# Patient Record
Sex: MALE | Race: WHITE | NOT HISPANIC OR LATINO | Employment: OTHER | URBAN - METROPOLITAN AREA
[De-identification: names, ages, dates, MRNs, and addresses within clinical notes are randomized per-mention and may not be internally consistent; named-entity substitution may affect disease eponyms.]

---

## 2017-10-03 ENCOUNTER — OUTSIDE FACILITY SERVICE (OUTPATIENT)
Dept: CARDIOLOGY | Facility: CLINIC | Age: 67
End: 2017-10-03

## 2017-10-03 PROCEDURE — 93018 CV STRESS TEST I&R ONLY: CPT | Performed by: INTERNAL MEDICINE

## 2017-10-03 PROCEDURE — 78452 HT MUSCLE IMAGE SPECT MULT: CPT | Performed by: INTERNAL MEDICINE

## 2017-10-30 PROBLEM — E66.9 CLASS 2 OBESITY IN ADULT: Status: ACTIVE | Noted: 2017-10-30

## 2017-10-30 PROBLEM — F41.9 ANXIETY: Status: ACTIVE | Noted: 2017-10-30

## 2017-10-30 PROBLEM — E78.2 MIXED HYPERLIPIDEMIA: Status: ACTIVE | Noted: 2017-10-30

## 2017-10-30 PROBLEM — Z86.73 HISTORY OF CVA (CEREBROVASCULAR ACCIDENT): Status: ACTIVE | Noted: 2017-10-30

## 2017-10-30 PROBLEM — I25.10 CAD (CORONARY ARTERY DISEASE): Status: ACTIVE | Noted: 2017-10-30

## 2017-10-30 PROBLEM — K21.9 GASTROESOPHAGEAL REFLUX DISEASE WITHOUT ESOPHAGITIS: Status: ACTIVE | Noted: 2017-10-30

## 2017-10-30 PROBLEM — I10 ESSENTIAL HYPERTENSION: Status: ACTIVE | Noted: 2017-10-30

## 2017-10-30 PROBLEM — I20.9 ANGINA PECTORIS (HCC): Status: ACTIVE | Noted: 2017-10-30

## 2017-10-30 PROBLEM — E11.9 TYPE 2 DIABETES MELLITUS (HCC): Status: ACTIVE | Noted: 2017-10-30

## 2017-10-30 PROBLEM — G47.33 OSA (OBSTRUCTIVE SLEEP APNEA): Status: ACTIVE | Noted: 2017-10-30

## 2017-10-31 ENCOUNTER — OFFICE VISIT (OUTPATIENT)
Dept: CARDIOLOGY | Facility: CLINIC | Age: 67
End: 2017-10-31

## 2017-10-31 VITALS
DIASTOLIC BLOOD PRESSURE: 59 MMHG | BODY MASS INDEX: 36.96 KG/M2 | WEIGHT: 230 LBS | HEART RATE: 60 BPM | SYSTOLIC BLOOD PRESSURE: 113 MMHG | HEIGHT: 66 IN

## 2017-10-31 DIAGNOSIS — I20.9 ANGINA PECTORIS (HCC): Primary | ICD-10-CM

## 2017-10-31 DIAGNOSIS — I25.119 CORONARY ARTERY DISEASE INVOLVING NATIVE CORONARY ARTERY OF NATIVE HEART WITH ANGINA PECTORIS (HCC): ICD-10-CM

## 2017-10-31 DIAGNOSIS — E78.2 MIXED HYPERLIPIDEMIA: ICD-10-CM

## 2017-10-31 DIAGNOSIS — I10 ESSENTIAL HYPERTENSION: ICD-10-CM

## 2017-10-31 PROBLEM — I20.1 PRINZMETAL ANGINA (HCC): Status: ACTIVE | Noted: 2017-10-30

## 2017-10-31 PROBLEM — J44.9 COPD (CHRONIC OBSTRUCTIVE PULMONARY DISEASE) (HCC): Status: ACTIVE | Noted: 2017-10-31

## 2017-10-31 PROCEDURE — 99204 OFFICE O/P NEW MOD 45 MIN: CPT | Performed by: INTERNAL MEDICINE

## 2017-10-31 PROCEDURE — 93000 ELECTROCARDIOGRAM COMPLETE: CPT | Performed by: INTERNAL MEDICINE

## 2017-10-31 RX ORDER — ROSUVASTATIN CALCIUM 20 MG/1
20 TABLET, COATED ORAL DAILY
COMMUNITY
End: 2020-11-03

## 2017-10-31 RX ORDER — ALBUTEROL SULFATE 90 UG/1
90 AEROSOL, METERED RESPIRATORY (INHALATION) AS NEEDED
COMMUNITY
End: 2020-11-03

## 2017-10-31 RX ORDER — INSULIN GLARGINE 100 [IU]/ML
INJECTION, SOLUTION SUBCUTANEOUS DAILY
COMMUNITY
End: 2018-04-17

## 2017-10-31 RX ORDER — CARVEDILOL 12.5 MG/1
12.5 TABLET ORAL 2 TIMES DAILY WITH MEALS
COMMUNITY

## 2017-10-31 RX ORDER — ESCITALOPRAM OXALATE 20 MG/1
20 TABLET ORAL DAILY
COMMUNITY

## 2017-10-31 RX ORDER — RANOLAZINE 500 MG/1
500 TABLET, EXTENDED RELEASE ORAL 2 TIMES DAILY
Qty: 60 TABLET | Refills: 6 | COMMUNITY
Start: 2017-10-31 | End: 2018-04-17

## 2017-10-31 RX ORDER — CLOPIDOGREL BISULFATE 75 MG/1
75 TABLET ORAL DAILY
COMMUNITY
End: 2020-11-03

## 2017-10-31 RX ORDER — BUMETANIDE 1 MG/1
1 TABLET ORAL DAILY
COMMUNITY
End: 2021-06-22

## 2017-10-31 RX ORDER — BACLOFEN 10 MG/1
10 TABLET ORAL 2 TIMES DAILY
COMMUNITY
End: 2020-11-03 | Stop reason: SDUPTHER

## 2017-10-31 RX ORDER — LISINOPRIL 40 MG/1
40 TABLET ORAL DAILY
COMMUNITY
End: 2017-10-31

## 2017-10-31 RX ORDER — ALPRAZOLAM 1 MG/1
1 TABLET ORAL DAILY
COMMUNITY

## 2017-10-31 RX ORDER — BUDESONIDE AND FORMOTEROL FUMARATE DIHYDRATE 160; 4.5 UG/1; UG/1
2 AEROSOL RESPIRATORY (INHALATION)
COMMUNITY
End: 2020-11-03

## 2017-10-31 RX ORDER — BUTALBITAL, ACETAMINOPHEN AND CAFFEINE 50; 325; 40 MG/1; MG/1; MG/1
1 TABLET ORAL EVERY 4 HOURS PRN
COMMUNITY
End: 2020-11-03

## 2017-10-31 RX ORDER — ESOMEPRAZOLE MAGNESIUM 40 MG/1
40 CAPSULE, DELAYED RELEASE ORAL
COMMUNITY
End: 2020-11-03

## 2017-10-31 RX ORDER — LISINOPRIL 20 MG/1
20 TABLET ORAL DAILY
Qty: 30 TABLET | Refills: 6 | Status: SHIPPED | OUTPATIENT
Start: 2017-10-31 | End: 2021-06-22

## 2017-10-31 RX ORDER — NITROGLYCERIN 0.3 MG/1
TABLET SUBLINGUAL
Qty: 100 TABLET | Refills: 11 | Status: SHIPPED | OUTPATIENT
Start: 2017-10-31 | End: 2017-11-02 | Stop reason: SDUPTHER

## 2017-10-31 NOTE — PROGRESS NOTES
Encounter Date:10/31/2017      Patient ID: Ryan Odonnell is a 67 y.o. male.    Chief Complaint: Chest Pain      PROBLEM LIST:  1. ***    History of Present Illness  Patient presents today for follow-up with a history of *** and cardiac risk factors. ***. Denies chest pain, shortness of breath, leg swelling, palpitations, and syncope. Remains busy and active with ***.    Allergies not on file    No current outpatient prescriptions on file.    The following portions of the patient's history were reviewed and updated as appropriate: allergies, current medications, past family history, past medical history, past social history, past surgical history and problem list.    ROS  Review of Systems   Constitution: Negative for chills, fever, weight gain and weight loss.   Cardiovascular: Negative for chest pain, claudication, dyspnea on exertion, leg swelling, orthopnea, palpitations, paroxysmal nocturnal dyspnea and syncope.        No dizziness   Gastrointestinal: Negative for abdominal pain, constipation, diarrhea, nausea and vomiting.   Genitourinary:        No urinary symptoms   Neurological:        No symptoms of stroke.   All other systems reviewed and are negative.    Objective:     There were no vitals taken for this visit.  Repeat blood pressure measurement by, Fish Burton MD, at ***      Physical Exam  Constitutional: She appears well-developed and well-nourished.   HENT:   HEENT exam unremarkable.   Neck: Neck supple. No JVD present.   No carotid bruits.   Cardiovascular: Normal rate, regular rhythm and normal heart sounds.    No murmur heard.  2 plus symmetric pulses.   Pulmonary/Chest: Breath sounds normal. Does not exhibit tenderness.   Abdominal:   Abdomen benign.   Musculoskeletal: Does not exhibit edema.   Neurological:   Neurological exam unremarkable.   Vitals reviewed.    Lab Review:   Procedures       Assessment:   No diagnosis found.  Plan:   ***  Stable cardiac status.  Continue current  medications.   FU in 6 MO, sooner as needed.  Thank you for allowing us to participate in the care of your patient.     Scribed for Fish Burton MD by Seymour Torres. 10/31/2017  9:28 AM    ***    Please note that portions of this note may have been completed with a voice recognition program. Efforts were made to edit the dictations, but occasionally words are mistranscribed.

## 2017-10-31 NOTE — PROGRESS NOTES
Subjective   Facundo Odonnell is a 67 y.o. male.  MD Katie Stafford MD  [address] [facility] [place] [address]      Chief Complaint   Patient presents with   • Chest Pain       Patient Active Problem List    Diagnosis   • CAD (coronary artery disease) [I25.10]               1. [facility] 12-7-2009: No hemodynamically significant disease. Minor luminal irregularities, 30% LAD, 10% Circ.  EF 60%    2. Echo 9-2-17:Normal LV wall thickness.  EF 45-50%. Mild global HK.    3. MPS 10-3-17: No anginal symptoms. No EKG changes. Fixed apical defect. No reversible ischemia.  EF 45%       • Angina pectoris [I20.9]        • Class 2 obesity in adult [E66.9]        • Essential hypertension [I10]        • Mixed hyperlipidemia [E78.2]        • Type 2 diabetes mellitus [E11.9]        • ANDREW (obstructive sleep apnea) [G47.33]        • Gastroesophageal reflux disease without esophagitis [K21.9]        • Anxiety [F41.9]        • History of CVA (cerebrovascular accident) [Z86.73]               1. Ischemic, Left paramedian pontine lacunar stroke with right hemiparesis     • COPD (chronic obstructive pulmonary disease) [J44.9]        History of Present Illness   This is a 67 year old hypertensive dyslipidemic obese diabetic male with known nonobstructive coronary artery disease.  He had cardiac catheterization in December 2009 showing no worse than 30% lesions.  Patient then started seeing [facility].  He reports having 5 subsequent cardiac catheterizations showing no change in disease burden.  He presents to our service at today for a second opinion regarding ongoing recurrent severe midsternal chest pain.  He states he has been hospitalized twice with a subsequent ER visit in the past couple of months.  He describes heavy midsternal chest pressure lasting 4-5 hours at a time and rated as an 8/10 in severity.  It is not take sublingual nitroglycerin.  He reports having associated shortness of breath nausea  diaphoresis and radiating pain.  He had a myocardial perfusion study earlier this month showing no reversible ischemia.  His mobility is severely restricted due to his history of CVA.  He reports having had an EGD in 2014 after his CVA which was reported to be unremarkable.  He has some component of pleuritic chest pain.  He reports his glucose is been very difficult to control lately with random glucose is 350-360.  He denies orthopnea or PND.  He has mild lower extremity edema which is self-limiting.  He has no awareness of tachycardia arrhythmias no dizziness or syncope.  His recent echocardiogram showed an EF of 45-50% with no significant valvular abnormalities.  He does not check his blood pressure regularly at home, his cholesterol is followed by his primary care physician.  Patient states he is compliant with all of his medications.    The following portions of the patient's history were reviewed and updated as appropriate: allergies, current medications, past family history, past medical history, past social history, past surgical history and problem list.    Allergies   Allergen Reactions   • Erythromycin    • Keflex [Cephalexin]    • Nitrates, Organic      ntg           Current Outpatient Prescriptions:   •  Albuterol Sulfate (VENTOLIN HFA IN), Inhale., Disp: , Rfl:   •  ALPRAZolam (XANAX) 1 MG tablet, Take 1 mg by mouth Daily., Disp: , Rfl:   •  aspirin 81 MG tablet, Take 81 mg by mouth Daily., Disp: , Rfl:   •  baclofen (LIORESAL) 10 MG tablet, Take 10 mg by mouth 2 (Two) Times a Day., Disp: , Rfl:   •  budesonide-formoterol (SYMBICORT) 160-4.5 MCG/ACT inhaler, Inhale 2 puffs 2 (Two) Times a Day., Disp: , Rfl:   •  bumetanide (BUMEX) 1 MG tablet, Take 1 mg by mouth Daily., Disp: , Rfl:   •  butalbital-acetaminophen-caffeine (FIORICET, ESGIC) -40 MG per tablet, Take 1 tablet by mouth Every 4 (Four) Hours As Needed for Headache., Disp: , Rfl:   •  carvedilol (COREG) 6.25 MG tablet, Take 6.25 mg by  mouth 2 (Two) Times a Day With Meals., Disp: , Rfl:   •  clopidogrel (PLAVIX) 75 MG tablet, Take 75 mg by mouth Daily., Disp: , Rfl:   •  escitalopram (LEXAPRO) 20 MG tablet, Take 20 mg by mouth Daily., Disp: , Rfl:   •  esomeprazole (nexIUM) 40 MG capsule, Take 40 mg by mouth Every Morning Before Breakfast., Disp: , Rfl:   •  FENOFIBRATE PO, Take  by mouth Daily., Disp: , Rfl:   •  insulin glargine (LANTUS) 100 UNIT/ML injection, Inject  under the skin Daily., Disp: , Rfl:   •  metFORMIN (GLUCOPHAGE) 1000 MG tablet, Take 1,000 mg by mouth 2 (Two) Times a Day With Meals., Disp: , Rfl:   •  rosuvastatin (CRESTOR) 20 MG tablet, Take 20 mg by mouth Daily., Disp: , Rfl:   •  lisinopril (PRINIVIL,ZESTRIL) 20 MG tablet, Take 1 tablet by mouth Daily., Disp: 30 tablet, Rfl: 6  •  nitroglycerin (NITROSTAT) 0.3 MG SL tablet, 1 under the tongue as needed for angina, may repeat q5mins for up three doses, Disp: 100 tablet, Rfl: 11  •  ranolazine (RANEXA) 500 MG 12 hr tablet, Take 1 tablet by mouth 2 (Two) Times a Day., Disp: 60 tablet, Rfl: 6    Review of Systems   Constitution: Negative.   HENT: Negative.    Eyes: Negative.    Cardiovascular: Positive for chest pain and leg swelling. Negative for claudication, cyanosis, dyspnea on exertion, irregular heartbeat, near-syncope, orthopnea, palpitations, paroxysmal nocturnal dyspnea and syncope.   Respiratory: Positive for shortness of breath and wheezing.    Endocrine: Negative.    Hematologic/Lymphatic: Negative.    Skin: Negative.    Musculoskeletal: Negative.    Gastrointestinal: Negative.    Genitourinary: Negative.    Neurological: Negative.    Psychiatric/Behavioral: Negative.    Allergic/Immunologic: Negative.    All other systems reviewed and are negative.      Social History     Social History   • Marital status:      Spouse name: N/A   • Number of children: N/A   • Years of education: N/A     Occupational History   • Not on file.     Social History Main Topics   •  "Smoking status: Former Smoker     Quit date: 1986   • Smokeless tobacco: Not on file   • Alcohol use No   • Drug use: No   • Sexual activity: Defer     Other Topics Concern   • Not on file     Social History Narrative       Family History   Problem Relation Age of Onset   • COPD Mother    • Cancer Father    • Heart disease Brother    • Stroke Brother        Objective      Blood pressure 113/59, pulse 60, height 66\" (167.6 cm), weight 230 lb (104 kg).    Physical Exam   Constitutional: He is oriented to person, place, and time. He appears well-developed and well-nourished.   HENT:   Head: Normocephalic and atraumatic.   Mouth/Throat: Oropharynx is clear and moist.   Eyes: EOM are normal. Pupils are equal, round, and reactive to light. No scleral icterus.   Neck: Neck supple. No JVD present. No thyromegaly present.   Cardiovascular: Normal rate, regular rhythm and normal heart sounds.  Exam reveals no gallop and no friction rub.    No murmur heard.  Pulmonary/Chest: Breath sounds normal. No stridor. He has no wheezes. He has no rales.   Abdominal: Soft. Bowel sounds are normal. He exhibits no distension and no mass. There is no tenderness.   Musculoskeletal: Normal range of motion. He exhibits edema. He exhibits no tenderness or deformity.   Lymphadenopathy:     He has no cervical adenopathy.   Neurological: He is alert and oriented to person, place, and time. No cranial nerve deficit. Coordination normal.   Skin: Skin is warm and dry. No rash noted. No erythema.   Psychiatric: He has a normal mood and affect.   Vitals reviewed.        ECG 12 Lead  Date/Time: 10/31/2017 1:16 PM  Performed by: EDU ROBBINS  Authorized by: EDU ROBBINS   Rhythm: sinus bradycardia  Rate: bradycardic  Conduction: conduction normal  ST Segments: ST segments normal  T Waves: T waves normal  QRS axis: normal  Q waves: V1, V2 and V3  Clinical impression: abnormal ECG  Comments: Rate 56            Lab Review:             Assessment:   " Diagnosis Plan   1. Angina pectoris  Ranexa 500 bid, NTG 0.3 mg SL prn   2. Coronary artery disease involving native coronary artery of native heart with angina pectoris     3. Essential hypertension  Decrease Lisinopril to 20 mg daily   4. Mixed hyperlipidemia  Continue current Rx     Plan:  Stroke hospitalization records will be requested from [facility].  Continue current medications.   FU in 3 mo  sooner as needed.  Thank you for allowing us to participate in the care of your patient.     Scribed for Fish Burton MD by Jc Wilson PA-C. 10/31/2017  10:40 AM     I, Fish Burton MD, personally performed the services described in this documentation as scribed by the above named individual in my presence, and it is both accurate and complete.  10/31/2017  1:59 PM

## 2017-11-02 RX ORDER — NITROGLYCERIN 0.3 MG/1
TABLET SUBLINGUAL
Qty: 25 TABLET | Refills: 4 | Status: SHIPPED | OUTPATIENT
Start: 2017-11-02 | End: 2020-04-06

## 2018-02-12 ENCOUNTER — OUTSIDE FACILITY SERVICE (OUTPATIENT)
Dept: CARDIOLOGY | Facility: CLINIC | Age: 68
End: 2018-02-12

## 2018-02-12 PROCEDURE — 93306 TTE W/DOPPLER COMPLETE: CPT | Performed by: INTERNAL MEDICINE

## 2018-04-16 NOTE — PROGRESS NOTES
Encounter Date:04/17/2018      Patient ID: Ryan Odonnell is a 67 y.o. male.    Chief Complaint: Coronary Artery Disease      PROBLEM LIST:  1. CAD  a. [facility] 12/7/2009: No hemodynamically significant disease. Minor luminal irregularities, 30% LAD, 10% Circ.  EF 60%  b. Echo 9/2/2017: Normal LV wall thickness.  EF 45-50%.  Mild global HK  c. [facility] 10/3/2017: No anginal symptoms.  No EKG changes. Fixed apical defect. No reversible ischemia. EF 45%.  2. Angina  3. Class II obesity and adult  4. Essential hypertension  5. Mixed hyperlipidemia  6. Type 2 diabetes mellitus  7. Obstructive sleep apnea  8. Gastroesophageal reflux disease without esophagitis  9. Anxiety  10. History of CVA  a. Ischemic, left paramedian pontine lacunar stroke with right hemiparesis  11. COPD    History of Present Illness  Patient presents today for follow-up with a history of CAD and cardiac risk factors. Since last visit He's had another hospital admission to [facility] for chest pain.  MI was excluded.  He had another stress test that was normal.  He was seen by Dr. Chaudhary from our practice who reviewed his EKGs and noted no change from baseline.  Her last visit we added Ranexa 500 mg twice a day to his regimen.  This has helped considerably with his chest pain.  He states he now only has chest pain about once every other week.  He does not take sublingual nitroglycerin as he was told emergency department never to take it.  His blood glucoses are now running in the 200s.  He continues complaining of profound weakness and cannot walk more than 30-40 feet.  Patient states he is too weak to participate in physical therapy.  He has had one or 2 presyncopal episodes which were not associated with low blood pressure, low blood glucose or tachycardia arrhythmias.    Allergies   Allergen Reactions   • Erythromycin    • Keflex [Cephalexin]    • Nitrates, Organic      ntg           Current Outpatient Prescriptions:   •  Albuterol Sulfate  (VENTOLIN HFA IN), Inhale., Disp: , Rfl:   •  ALPRAZolam (XANAX) 1 MG tablet, Take 1 mg by mouth Daily., Disp: , Rfl:   •  aspirin 81 MG tablet, Take 81 mg by mouth Daily., Disp: , Rfl:   •  baclofen (LIORESAL) 10 MG tablet, Take 10 mg by mouth 2 (Two) Times a Day., Disp: , Rfl:   •  budesonide-formoterol (SYMBICORT) 160-4.5 MCG/ACT inhaler, Inhale 2 puffs 2 (Two) Times a Day., Disp: , Rfl:   •  bumetanide (BUMEX) 1 MG tablet, Take 1 mg by mouth Daily., Disp: , Rfl:   •  butalbital-acetaminophen-caffeine (FIORICET, ESGIC) -40 MG per tablet, Take 1 tablet by mouth Every 4 (Four) Hours As Needed for Headache., Disp: , Rfl:   •  carvedilol (COREG) 6.25 MG tablet, Take 6.25 mg by mouth 2 (Two) Times a Day With Meals., Disp: , Rfl:   •  clopidogrel (PLAVIX) 75 MG tablet, Take 75 mg by mouth Daily., Disp: , Rfl:   •  escitalopram (LEXAPRO) 20 MG tablet, Take 20 mg by mouth Daily., Disp: , Rfl:   •  esomeprazole (nexIUM) 40 MG capsule, Take 40 mg by mouth Every Morning Before Breakfast., Disp: , Rfl:   •  FENOFIBRATE PO, Take  by mouth Daily., Disp: , Rfl:   •  insulin detemir (LEVEMIR) 100 UNIT/ML injection, Inject 50 Units under the skin 2 (Two) Times a Day., Disp: , Rfl:   •  lisinopril (PRINIVIL,ZESTRIL) 20 MG tablet, Take 1 tablet by mouth Daily. (Patient taking differently: Take 40 mg by mouth Daily.), Disp: 30 tablet, Rfl: 6  •  metFORMIN (GLUCOPHAGE) 1000 MG tablet, Take 1,000 mg by mouth 2 (Two) Times a Day With Meals., Disp: , Rfl:   •  nitroglycerin (NITROSTAT) 0.3 MG SL tablet, 1 under the tongue as needed for angina, may repeat q5mins for up three doses, Disp: 25 tablet, Rfl: 4  •  ranolazine (RANEXA) 500 MG 12 hr tablet, Take 1 tablet by mouth 2 (Two) Times a Day., Disp: 60 tablet, Rfl: 6  •  rosuvastatin (CRESTOR) 20 MG tablet, Take 20 mg by mouth Daily., Disp: , Rfl:     The following portions of the patient's history were reviewed and updated as appropriate: allergies, current medications, past  "family history, past medical history, past social history, past surgical history and problem list.    Review of Systems   Constitution: Positive for weakness and malaise/fatigue. Negative for diaphoresis and weight gain.   Cardiovascular: Positive for chest pain, dyspnea on exertion and leg swelling. Negative for claudication, irregular heartbeat, orthopnea, palpitations, paroxysmal nocturnal dyspnea and syncope.   Respiratory: Negative for cough, shortness of breath, sleep disturbances due to breathing and wheezing.    Hematologic/Lymphatic: Negative for bleeding problem.   Musculoskeletal: Positive for muscle weakness. Negative for muscle cramps and myalgias.   Gastrointestinal: Negative for heartburn.   Neurological: Positive for headaches, light-headedness and loss of balance.         Objective:     Blood pressure 129/89, pulse 73, height 167.6 cm (66\"), weight 107 kg (235 lb).        Physical Exam   Constitutional: He is oriented to person, place, and time. He appears well-developed and well-nourished.   Cardiovascular: Normal rate, regular rhythm, normal heart sounds and intact distal pulses.  Exam reveals no gallop and no friction rub.    No murmur heard.  Pulmonary/Chest: Effort normal and breath sounds normal. No respiratory distress. He has no wheezes. He has no rales. He exhibits no tenderness.   Bases clear   Musculoskeletal: Normal range of motion. He exhibits no edema.   Neurological: He is alert and oriented to person, place, and time.     .    Lab Review:   Procedures       Assessment:      Diagnosis Plan   1. Coronary artery disease involving native coronary artery of native heart with angina pectoris  Increase Ranexa 1000 mg twice a day    2. Essential hypertension  Well controlled current medical regimen    3. Mixed hyperlipidemia  On statin therapy followed by primary care    4.     Episodes of pre-syncope likely related to autonomic dysfunction  Plan:   Will again request records from [facility] " Center regarding stroke  Continue current medications.   FU in 6 MO, sooner as needed.  Thank you for allowing us to participate in the care of your patient.       TRINA Moses  Date  10:42 AM    Please note that portions of this note may have been completed with a voice recognition program. Efforts were made to edit the dictations, but occasionally words are mistranscribed.

## 2018-04-17 ENCOUNTER — OFFICE VISIT (OUTPATIENT)
Dept: CARDIOLOGY | Facility: CLINIC | Age: 68
End: 2018-04-17

## 2018-04-17 VITALS
HEIGHT: 66 IN | BODY MASS INDEX: 37.77 KG/M2 | WEIGHT: 235 LBS | DIASTOLIC BLOOD PRESSURE: 89 MMHG | SYSTOLIC BLOOD PRESSURE: 129 MMHG | HEART RATE: 73 BPM

## 2018-04-17 DIAGNOSIS — I10 ESSENTIAL HYPERTENSION: ICD-10-CM

## 2018-04-17 DIAGNOSIS — I25.119 CORONARY ARTERY DISEASE INVOLVING NATIVE CORONARY ARTERY OF NATIVE HEART WITH ANGINA PECTORIS (HCC): Primary | ICD-10-CM

## 2018-04-17 DIAGNOSIS — E78.2 MIXED HYPERLIPIDEMIA: ICD-10-CM

## 2018-04-17 PROCEDURE — 99214 OFFICE O/P EST MOD 30 MIN: CPT | Performed by: PHYSICIAN ASSISTANT

## 2018-04-17 RX ORDER — RANOLAZINE 1000 MG/1
1000 TABLET, EXTENDED RELEASE ORAL 2 TIMES DAILY
Qty: 60 TABLET | Refills: 6 | Status: SHIPPED | OUTPATIENT
Start: 2018-04-17

## 2018-10-23 ENCOUNTER — OFFICE VISIT (OUTPATIENT)
Dept: CARDIOLOGY | Facility: CLINIC | Age: 68
End: 2018-10-23

## 2018-10-23 VITALS
HEART RATE: 61 BPM | WEIGHT: 238 LBS | HEIGHT: 66 IN | DIASTOLIC BLOOD PRESSURE: 72 MMHG | BODY MASS INDEX: 38.25 KG/M2 | SYSTOLIC BLOOD PRESSURE: 129 MMHG

## 2018-10-23 DIAGNOSIS — E78.2 MIXED HYPERLIPIDEMIA: ICD-10-CM

## 2018-10-23 DIAGNOSIS — I10 ESSENTIAL HYPERTENSION: ICD-10-CM

## 2018-10-23 DIAGNOSIS — I25.119 CORONARY ARTERY DISEASE INVOLVING NATIVE CORONARY ARTERY OF NATIVE HEART WITH ANGINA PECTORIS (HCC): Primary | ICD-10-CM

## 2018-10-23 PROCEDURE — 99213 OFFICE O/P EST LOW 20 MIN: CPT | Performed by: INTERNAL MEDICINE

## 2018-10-23 RX ORDER — INSULIN DETEMIR 100 [IU]/ML
80 INJECTION, SOLUTION SUBCUTANEOUS 2 TIMES DAILY
COMMUNITY
Start: 2018-10-12 | End: 2019-09-03

## 2018-10-23 NOTE — PROGRESS NOTES
Encounter Date:10/23/2018      Patient ID: Ryan Odonnell is a 68 y.o. male.    Chief Complaint: Coronary Artery Disease      PROBLEM LIST:  1. CAD  a. [facility] 12/7/2009: No hemodynamically significant disease. Minor luminal irregularities, 30% LAD, 10% Circ.  EF 60%  b. Echo 9/2/2017: Normal LV wall thickness.  EF 45-50%.  Mild global HK  c. [facility] 10/3/2017: No anginal symptoms.  No EKG changes. Fixed apical defect. No reversible ischemia. EF 45%.  d. Echo, 2/12/2018: EF 45-50%.  e. [facility]PS, 10/8/2018: negative for ischemia, fixed inferior defect, EF 48%.  2. Angina  3. Class II obesity and adult  4. Essential hypertension  5. Mixed hyperlipidemia  6. Type 2 diabetes mellitus  7. Obstructive sleep apnea  8. Gastroesophageal reflux disease without esophagitis  9. Anxiety  10. History of CVA  a. Ischemic, left paramedian pontine lacunar stroke with right hemiparesis  11. COPD    History of Present Illness  Patient presents today for follow-up with a history of CAD, prinzmetal angina, and cardiac risk factors. He recently was hospitalized due to shortness of breath and chest pain. He had a stress test and echo which were unremarkable.. He is currently doing well. Dr. Reyes maintains and monitors the cholesterol medications. He has had a stroke in past, which resulted in a right side weakness. Denies chest pain, shortness of breath, leg swelling, palpitations, and syncope. He is currently restricted to a wheelchair.         Allergies   Allergen Reactions   • Erythromycin    • Keflex [Cephalexin]    • Nitrates, Organic      ntg           Current Outpatient Prescriptions:   •  Albuterol Sulfate (VENTOLIN HFA IN), Inhale., Disp: , Rfl:   •  ALPRAZolam (XANAX) 1 MG tablet, Take 1 mg by mouth Daily., Disp: , Rfl:   •  aspirin 81 MG tablet, Take 81 mg by mouth Daily., Disp: , Rfl:   •  baclofen (LIORESAL) 10 MG tablet, Take 10 mg by mouth 2 (Two) Times a Day., Disp: , Rfl:   •  budesonide-formoterol (SYMBICORT) 160-4.5  MCG/ACT inhaler, Inhale 2 puffs 2 (Two) Times a Day., Disp: , Rfl:   •  bumetanide (BUMEX) 1 MG tablet, Take 1 mg by mouth Daily., Disp: , Rfl:   •  butalbital-acetaminophen-caffeine (FIORICET, ESGIC) -40 MG per tablet, Take 1 tablet by mouth Every 4 (Four) Hours As Needed for Headache., Disp: , Rfl:   •  carvedilol (COREG) 6.25 MG tablet, Take 6.25 mg by mouth 2 (Two) Times a Day With Meals., Disp: , Rfl:   •  clopidogrel (PLAVIX) 75 MG tablet, Take 75 mg by mouth Daily., Disp: , Rfl:   •  escitalopram (LEXAPRO) 20 MG tablet, Take 20 mg by mouth Daily., Disp: , Rfl:   •  esomeprazole (nexIUM) 40 MG capsule, Take 40 mg by mouth Every Morning Before Breakfast., Disp: , Rfl:   •  FENOFIBRATE PO, Take  by mouth Daily., Disp: , Rfl:   •  insulin detemir (LEVEMIR) 100 UNIT/ML injection, Inject 50 Units under the skin 2 (Two) Times a Day., Disp: , Rfl:   •  LEVEMIR FLEXTOUCH 100 UNIT/ML injection, Inject 80 Units under the skin into the appropriate area as directed 2 (Two) Times a Day., Disp: , Rfl:   •  lisinopril (PRINIVIL,ZESTRIL) 20 MG tablet, Take 1 tablet by mouth Daily. (Patient taking differently: Take 40 mg by mouth Daily.), Disp: 30 tablet, Rfl: 6  •  metFORMIN (GLUCOPHAGE) 1000 MG tablet, Take 1,000 mg by mouth 2 (Two) Times a Day With Meals., Disp: , Rfl:   •  nitroglycerin (NITROSTAT) 0.3 MG SL tablet, 1 under the tongue as needed for angina, may repeat q5mins for up three doses, Disp: 25 tablet, Rfl: 4  •  ranolazine (RANEXA) 1000 MG 12 hr tablet, Take 1 tablet by mouth 2 (Two) Times a Day., Disp: 60 tablet, Rfl: 6  •  rosuvastatin (CRESTOR) 20 MG tablet, Take 20 mg by mouth Daily., Disp: , Rfl:     The following portions of the patient's history were reviewed and updated as appropriate: allergies, current medications, past family history, past medical history, past social history, past surgical history and problem list.    ROS  Review of Systems   Constitution: Negative for chills, fever, weight gain  "and weight loss.   Cardiovascular: Negative for chest pain, claudication, dyspnea on exertion, leg swelling, orthopnea, palpitations, paroxysmal nocturnal dyspnea and syncope.        No dizziness   Gastrointestinal: Negative for abdominal pain, constipation, diarrhea, nausea and vomiting.   Genitourinary:        No urinary symptoms   Neurological:        No symptoms of stroke.   All other systems reviewed and are negative.    Objective:     Blood pressure 129/72, pulse 61, height 167.6 cm (66\"), weight 108 kg (238 lb).      Physical Exam  Constitutional: She appears well-developed and well-nourished.   HENT:   HEENT exam unremarkable.   Neck: Neck supple. No JVD present.   No carotid bruits.   Cardiovascular: Normal rate, regular rhythm and normal heart sounds.    No murmur heard.  2 plus symmetric pulses.   Pulmonary/Chest: Breath sounds normal. Does not exhibit tenderness.   Abdominal:   Abdomen benign.   Musculoskeletal: Does not exhibit edema.   Neurological:   Neurological exam unremarkable.   Vitals reviewed.    Lab Review:   Procedures     Lipid Panel (9/18/2018)     HDL 19  LDL 7        Assessment:      Diagnosis Plan   1. Coronary artery disease involving native coronary artery of native heart with angina pectoris (CMS/HCC)  Stable, continue current medications.     2. Essential hypertension  Well controlled.     3. Mixed hyperlipidemia  Managed by PCP.       Plan:   Stable cardiac status.  Continue current medications.   FU in 6 MO, sooner as needed.  Thank you for allowing us to participate in the care of your patient.     Scribed for Fish Burton MD by Cindy Ballesteros. 10/23/2018  10:47 AM    I, Fish Burton MD, personally performed the services described in this documentation as scribed by the above named individual in my presence, and it is both accurate and complete.  10/23/2018  11:29 AM        Please note that portions of this note may have been completed with a voice recognition " program. Efforts were made to edit the dictations, but occasionally words are mistranscribed.

## 2019-09-03 ENCOUNTER — OFFICE VISIT (OUTPATIENT)
Dept: CARDIOLOGY | Facility: CLINIC | Age: 69
End: 2019-09-03

## 2019-09-03 VITALS
SYSTOLIC BLOOD PRESSURE: 118 MMHG | BODY MASS INDEX: 37.28 KG/M2 | DIASTOLIC BLOOD PRESSURE: 60 MMHG | WEIGHT: 232 LBS | HEART RATE: 48 BPM | HEIGHT: 66 IN

## 2019-09-03 DIAGNOSIS — E78.2 MIXED HYPERLIPIDEMIA: ICD-10-CM

## 2019-09-03 DIAGNOSIS — I25.119 CORONARY ARTERY DISEASE INVOLVING NATIVE CORONARY ARTERY OF NATIVE HEART WITH ANGINA PECTORIS (HCC): Primary | ICD-10-CM

## 2019-09-03 DIAGNOSIS — I20.1 PRINZMETAL ANGINA (HCC): ICD-10-CM

## 2019-09-03 DIAGNOSIS — I10 ESSENTIAL HYPERTENSION: ICD-10-CM

## 2019-09-03 PROCEDURE — 99214 OFFICE O/P EST MOD 30 MIN: CPT | Performed by: INTERNAL MEDICINE

## 2019-09-03 RX ORDER — (INSULIN DEGLUDEC AND LIRAGLUTIDE) 100; 3.6 [IU]/ML; MG/ML
40 INJECTION, SOLUTION SUBCUTANEOUS
COMMUNITY
Start: 2019-08-20 | End: 2020-04-06

## 2019-09-03 NOTE — PROGRESS NOTES
Encounter Date:09/03/2019      Patient ID: Ryan Odonnell is a 68 y.o. male.    Katie Reyes MD    Chief Complaint: Coronary artery disease involving native coronary artery of       PROBLEM LIST:  Patient Active Problem List    Diagnosis    • Coronary artery disease involving native coronary artery of native heart with angina pectoris (CMS/HCC)      Priority: High     Note Last Updated: 10/30/2017     1. [facility] 12-7-2009: No hemodynamically significant disease. Minor luminal irregularities, 30% LAD, 10% Circ.  EF 60%    2. Echo 9-2-17:Normal LV wall thickness.  EF 45-50%. Mild global HK.    3. MPS 10-3-17: No anginal symptoms. No EKG changes. Fixed apical defect. No reversible ischemia.  EF 45%       • Prinzmetal angina (CMS/HCC)      Priority: High   • Class 2 obesity in adult      Priority: Medium   • Essential hypertension      Priority: Medium   • Mixed hyperlipidemia      Priority: Medium   • Type 2 diabetes mellitus (CMS/HCC)      Priority: Medium   • ANDREW (obstructive sleep apnea)      Priority: Medium   • Gastroesophageal reflux disease without esophagitis      Priority: Low   • Anxiety      Priority: Low   • History of CVA (cerebrovascular accident)      Priority: Low     Note Last Updated: 10/30/2017     1. Ischemic, Left paramedian pontine lacunar stroke with right hemiparesis     • COPD (chronic obstructive pulmonary disease) (CMS/HCC)            History of Present Illness  Patient presents today for follow-up with a history of CAD, hypertension, dyslipidemia.  He returns today complaining of 2 occasions of chest pain over the past 6 months.  There were both brief lasting less than 5 minutes, mild shortness of breath, no nausea diaphoresis or radiating pain.  He did not require sublingual nitroglycerin.  At the time he was pulling weeds and doing yard work.  He has had no pain at rest.  He denies orthopnea, PND, claudication.  He has a minor amount of swelling in the right upper and right  "lower extremities which are self-limiting.  He has no awareness of tachyarrhythmias, no dizziness or syncope.  He does not check his blood pressure regularly at home.  Cholesterol was recently checked by primary care and reported to be \"very low\".  His compliance current medical regimen reports no significant side effects.          Allergies   Allergen Reactions   • Erythromycin    • Keflex [Cephalexin]    • Nitrates, Organic      ntg           Current Outpatient Medications:   •  Albuterol Sulfate (VENTOLIN HFA IN), Inhale., Disp: , Rfl:   •  ALPRAZolam (XANAX) 1 MG tablet, Take 1 mg by mouth Daily., Disp: , Rfl:   •  aspirin 81 MG tablet, Take 81 mg by mouth Daily., Disp: , Rfl:   •  baclofen (LIORESAL) 10 MG tablet, Take 10 mg by mouth 2 (Two) Times a Day., Disp: , Rfl:   •  budesonide-formoterol (SYMBICORT) 160-4.5 MCG/ACT inhaler, Inhale 2 puffs 2 (Two) Times a Day., Disp: , Rfl:   •  bumetanide (BUMEX) 1 MG tablet, Take 1 mg by mouth Daily., Disp: , Rfl:   •  butalbital-acetaminophen-caffeine (FIORICET, ESGIC) -40 MG per tablet, Take 1 tablet by mouth Every 4 (Four) Hours As Needed for Headache., Disp: , Rfl:   •  carvedilol (COREG) 6.25 MG tablet, Take 6.25 mg by mouth 2 (Two) Times a Day With Meals., Disp: , Rfl:   •  clopidogrel (PLAVIX) 75 MG tablet, Take 75 mg by mouth Daily., Disp: , Rfl:   •  escitalopram (LEXAPRO) 20 MG tablet, Take 20 mg by mouth Daily., Disp: , Rfl:   •  esomeprazole (nexIUM) 40 MG capsule, Take 40 mg by mouth Every Morning Before Breakfast., Disp: , Rfl:   •  FENOFIBRATE PO, Take  by mouth Daily., Disp: , Rfl:   •  lisinopril (PRINIVIL,ZESTRIL) 20 MG tablet, Take 1 tablet by mouth Daily. (Patient taking differently: Take 40 mg by mouth Daily.), Disp: 30 tablet, Rfl: 6  •  nitroglycerin (NITROSTAT) 0.3 MG SL tablet, 1 under the tongue as needed for angina, may repeat q5mins for up three doses, Disp: 25 tablet, Rfl: 4  •  ranolazine (RANEXA) 1000 MG 12 hr tablet, Take 1 tablet " "by mouth 2 (Two) Times a Day., Disp: 60 tablet, Rfl: 6  •  rosuvastatin (CRESTOR) 20 MG tablet, Take 20 mg by mouth Daily., Disp: , Rfl:   •  XULTOPHY 100-3.6 UNIT-MG/ML solution pen-injector, 40 Units., Disp: , Rfl:     The following portions of the patient's history were reviewed and updated as appropriate: allergies, current medications, past family history, past medical history, past social history, past surgical history and problem list.    Review of Systems   Constitution: Negative for diaphoresis, weakness, malaise/fatigue and weight gain.   Cardiovascular: Negative for claudication, dyspnea on exertion, irregular heartbeat, leg swelling, orthopnea, palpitations, paroxysmal nocturnal dyspnea and syncope.   Respiratory: Negative for cough, shortness of breath, sleep disturbances due to breathing and wheezing.    Hematologic/Lymphatic: Negative for bleeding problem.   Musculoskeletal: Negative for muscle cramps, muscle weakness and myalgias.   Gastrointestinal: Negative for heartburn.     .    Objective:     /60 (BP Location: Left arm, Patient Position: Sitting)   Pulse (!) 48   Ht 167.6 cm (66\")   Wt 105 kg (232 lb)   BMI 37.45 kg/m²    Body mass index is 37.45 kg/m².     Physical Exam   Constitutional: He is oriented to person, place, and time. He appears well-developed and well-nourished.   Cardiovascular: Normal rate, regular rhythm, normal heart sounds and intact distal pulses. Exam reveals no gallop and no friction rub.   No murmur heard.  Pulmonary/Chest: Effort normal and breath sounds normal. No respiratory distress. He has no wheezes. He has no rales. He exhibits no tenderness.   Bases clear   Musculoskeletal: Normal range of motion. He exhibits no edema.   Trace edema right upper and right lower extremities   Neurological: He is alert and oriented to person, place, and time.       Lab Review:       Procedures             Assessment:      Diagnosis Plan   1. Coronary artery disease involving " native coronary artery of native heart with angina pectoris (CMS/HCC)   continue aspirin Plavix   2. Prinzmetal angina (CMS/[facility])  NYHA class II angina, well managed, continue current medical regimen   3. Essential hypertension  Well managed,  continue current therapy.     4. Mixed hyperlipidemia   on statin therapy, monitored by primary care and reported to be highly favorable.     Plan:     Stable cardiac status.  Continue current medications.   in 6 months, sooner as needed.  Thank you for allowing us to participate in the care of your patient.     Electronically signed by TRINA Moses, 09/03/19, 10:22 AM.      Please note that portions of this note may have been completed with a voice recognition program. Efforts were made to edit the dictations, but occasionally words are mistr

## 2020-04-06 ENCOUNTER — OFFICE VISIT (OUTPATIENT)
Dept: CARDIOLOGY | Facility: CLINIC | Age: 70
End: 2020-04-06

## 2020-04-06 VITALS — BODY MASS INDEX: 36.64 KG/M2 | HEIGHT: 66 IN | WEIGHT: 228 LBS

## 2020-04-06 DIAGNOSIS — I10 ESSENTIAL HYPERTENSION: ICD-10-CM

## 2020-04-06 DIAGNOSIS — I25.119 CORONARY ARTERY DISEASE INVOLVING NATIVE CORONARY ARTERY OF NATIVE HEART WITH ANGINA PECTORIS (HCC): Primary | ICD-10-CM

## 2020-04-06 DIAGNOSIS — E78.2 MIXED HYPERLIPIDEMIA: ICD-10-CM

## 2020-04-06 PROCEDURE — 99213 OFFICE O/P EST LOW 20 MIN: CPT | Performed by: INTERNAL MEDICINE

## 2020-04-06 NOTE — PROGRESS NOTES
Encounter Date:04/06/2020      Patient ID: Ryan Odonnell is a 69 y.o. male.    Katie Reyes MD    Chief Complaint: Coronary Artery Disease      PROBLEM LIST:  1. CAD  a. [facility] 12/7/2009: No hemodynamically significant disease. Minor luminal irregularities, 30% LAD, 10% Circ.  EF 60%  b. Echo 9/2/2017: Normal LV wall thickness.  EF 45-50%.  Mild global HK  c. MPS 10/3/2017: No anginal symptoms.  No EKG changes. Fixed apical defect. No reversible ischemia. EF 45%.  d. Echo, 2/12/2018: EF 45-50%.  e. MPS, 10/8/2018: negative for ischemia, fixed inferior defect, EF 48%.  2. Angina  3. Class II obesity and adult  4. Essential hypertension  5. Mixed hyperlipidemia  6. Type 2 diabetes mellitus  7. Obstructive sleep apnea  8. Gastroesophageal reflux disease without esophagitis  9. Anxiety  10. History of CVA  a. Ischemic, left paramedian pontine lacunar stroke with right hemiparesis  11. COPD    History of Present Illness  Telephone visit for follow-up evaluation of CAD, hypertension, dyslipidemia and cardiomyopathy.  He has done quite well since last evaluation.  Spends time in his electrical wheelchair and also walks some.  States that he tries to go to his outer building and participates in minor activities around the house.  Sometimes he gets short of breath with exertion, followed up with his pulmonologist and was advised to use his inhaler and BiPAP.  Has no current chest pain palpitations dizziness or syncope.  No edema orthopnea or PND.  Prescriptions were recently filled by PCP.  Patient does not have access to a blood pressure monitor.  His medications are reviewed and he reports compliance.    Allergies   Allergen Reactions   • Erythromycin    • Keflex [Cephalexin]    • Nitrates, Organic      ntg           Current Outpatient Medications:   •  albuterol sulfate HFA (Ventolin HFA) 108 (90 Base) MCG/ACT inhaler, Inhale 90 mcg As Needed., Disp: , Rfl:   •  ALPRAZolam (XANAX) 1 MG tablet, Take 1 mg by  mouth Daily., Disp: , Rfl:   •  aspirin 81 MG tablet, Take 81 mg by mouth Daily., Disp: , Rfl:   •  baclofen (LIORESAL) 10 MG tablet, Take 10 mg by mouth 2 (Two) Times a Day., Disp: , Rfl:   •  budesonide-formoterol (SYMBICORT) 160-4.5 MCG/ACT inhaler, Inhale 2 puffs 2 (Two) Times a Day., Disp: , Rfl:   •  bumetanide (BUMEX) 1 MG tablet, Take 1 mg by mouth Daily., Disp: , Rfl:   •  butalbital-acetaminophen-caffeine (FIORICET, ESGIC) -40 MG per tablet, Take 1 tablet by mouth Every 4 (Four) Hours As Needed for Headache., Disp: , Rfl:   •  carvedilol (COREG) 6.25 MG tablet, Take 6.25 mg by mouth 2 (Two) Times a Day With Meals., Disp: , Rfl:   •  clopidogrel (PLAVIX) 75 MG tablet, Take 75 mg by mouth Daily., Disp: , Rfl:   •  escitalopram (LEXAPRO) 20 MG tablet, Take 20 mg by mouth Daily., Disp: , Rfl:   •  esomeprazole (nexIUM) 40 MG capsule, Take 40 mg by mouth Every Morning Before Breakfast., Disp: , Rfl:   •  FENOFIBRATE PO, Take 200 mg by mouth Daily., Disp: , Rfl:   •  insulin detemir (LEVEMIR) 100 UNIT/ML injection, Inject 70 Units under the skin into the appropriate area as directed Daily., Disp: , Rfl:   •  lisinopril (PRINIVIL,ZESTRIL) 20 MG tablet, Take 1 tablet by mouth Daily. (Patient taking differently: Take 40 mg by mouth Daily.), Disp: 30 tablet, Rfl: 6  •  ranolazine (RANEXA) 1000 MG 12 hr tablet, Take 1 tablet by mouth 2 (Two) Times a Day., Disp: 60 tablet, Rfl: 6  •  rosuvastatin (CRESTOR) 20 MG tablet, Take 20 mg by mouth Daily., Disp: , Rfl:     The following portions of the patient's history were reviewed and updated as appropriate: allergies, current medications, past family history, past medical history, past social history, past surgical history and problem list.    Review of Systems   Constitution: Negative for diaphoresis, malaise/fatigue and weight gain.   Cardiovascular: Negative for chest pain, claudication, dyspnea on exertion, irregular heartbeat, leg swelling, orthopnea,  "palpitations, paroxysmal nocturnal dyspnea and syncope.   Respiratory: Negative for cough, shortness of breath, sleep disturbances due to breathing and wheezing.    Hematologic/Lymphatic: Negative for bleeding problem.   Musculoskeletal: Negative for muscle cramps, muscle weakness and myalgias.   Gastrointestinal: Negative for heartburn.   Neurological: Negative for weakness.     .    Objective:     Ht 167.6 cm (66\")   Wt 103 kg (228 lb)   BMI 36.80 kg/m²    Body mass index is 36.8 kg/m².     Physical Exam  Has not checked his blood pressure recently.  Lab Review:       Procedures             Assessment:      Diagnosis Plan   1. Coronary artery disease involving native coronary artery of native heart with angina pectoris (CMS/HCC)   stable, no significant angina or CHF symptoms.  Continue current medications.   2. Essential hypertension   has been well controlled on previous visits, no recent monitoring, patient advised to see if he can arrange a cuff and check his blood pressure with target of less than 130/85.   3. Mixed hyperlipidemia   continue current statin and fenofibrate therapy, monitored by PCP, goal LDL less than 70.     Plan:   Stable cardiac status.  Continue current medications.   Follow-up in 3 to 4 months at [facility], sooner as needed.  Thank you for allowing us to participate in the care of your patient.     Fish Burton MD, FAC, [facility]      Please note that portions of this note may have been completed with a voice recognition program. Efforts were made to edit the dictations, but occasionally words are mis-translated.      This patient has consented to a telehealth visit via telephone. The visit was scheduled as a telephone visit to comply with patient safety concerns in accordance with CDC recommendations.  All vitals recorded within this visit are reported by the patient.  I spent 15 minutes in total including but not limited to the 8 minutes spent in direct conversation with this " patient.

## 2020-06-30 ENCOUNTER — OUTSIDE FACILITY SERVICE (OUTPATIENT)
Dept: CARDIOLOGY | Facility: CLINIC | Age: 70
End: 2020-06-30

## 2020-06-30 PROCEDURE — 99222 1ST HOSP IP/OBS MODERATE 55: CPT | Performed by: INTERNAL MEDICINE

## 2020-11-03 ENCOUNTER — OFFICE VISIT (OUTPATIENT)
Dept: CARDIOLOGY | Facility: CLINIC | Age: 70
End: 2020-11-03

## 2020-11-03 VITALS
WEIGHT: 228 LBS | DIASTOLIC BLOOD PRESSURE: 64 MMHG | HEART RATE: 77 BPM | BODY MASS INDEX: 36.64 KG/M2 | OXYGEN SATURATION: 96 % | SYSTOLIC BLOOD PRESSURE: 114 MMHG | HEIGHT: 66 IN

## 2020-11-03 DIAGNOSIS — I10 ESSENTIAL HYPERTENSION: ICD-10-CM

## 2020-11-03 DIAGNOSIS — I48.0 PAROXYSMAL ATRIAL FIBRILLATION (HCC): ICD-10-CM

## 2020-11-03 DIAGNOSIS — I25.119 CORONARY ARTERY DISEASE INVOLVING NATIVE CORONARY ARTERY OF NATIVE HEART WITH ANGINA PECTORIS (HCC): Primary | ICD-10-CM

## 2020-11-03 DIAGNOSIS — E78.2 MIXED HYPERLIPIDEMIA: ICD-10-CM

## 2020-11-03 PROCEDURE — 99214 OFFICE O/P EST MOD 30 MIN: CPT | Performed by: INTERNAL MEDICINE

## 2020-11-03 RX ORDER — OMEPRAZOLE 40 MG/1
40 CAPSULE, DELAYED RELEASE ORAL DAILY
COMMUNITY

## 2020-11-03 RX ORDER — ATORVASTATIN CALCIUM 40 MG/1
40 TABLET, FILM COATED ORAL DAILY
COMMUNITY

## 2020-11-03 RX ORDER — BACLOFEN 10 MG/1
10 TABLET ORAL 2 TIMES DAILY
COMMUNITY
End: 2021-06-22

## 2020-11-03 RX ORDER — DILTIAZEM HYDROCHLORIDE 240 MG/1
240 CAPSULE, COATED, EXTENDED RELEASE ORAL DAILY
COMMUNITY
End: 2021-06-22

## 2021-06-22 ENCOUNTER — OFFICE VISIT (OUTPATIENT)
Dept: CARDIOLOGY | Facility: CLINIC | Age: 71
End: 2021-06-22

## 2021-06-22 VITALS
SYSTOLIC BLOOD PRESSURE: 108 MMHG | WEIGHT: 227 LBS | OXYGEN SATURATION: 96 % | HEIGHT: 66 IN | BODY MASS INDEX: 36.48 KG/M2 | HEART RATE: 99 BPM | DIASTOLIC BLOOD PRESSURE: 62 MMHG

## 2021-06-22 DIAGNOSIS — J81.0 ACUTE PULMONARY EDEMA (HCC): ICD-10-CM

## 2021-06-22 DIAGNOSIS — I10 ESSENTIAL HYPERTENSION: ICD-10-CM

## 2021-06-22 DIAGNOSIS — I25.119 CORONARY ARTERY DISEASE INVOLVING NATIVE CORONARY ARTERY OF NATIVE HEART WITH ANGINA PECTORIS (HCC): Primary | ICD-10-CM

## 2021-06-22 DIAGNOSIS — I48.0 PAROXYSMAL ATRIAL FIBRILLATION (HCC): ICD-10-CM

## 2021-06-22 DIAGNOSIS — E78.2 MIXED HYPERLIPIDEMIA: ICD-10-CM

## 2021-06-22 PROCEDURE — 99214 OFFICE O/P EST MOD 30 MIN: CPT | Performed by: INTERNAL MEDICINE

## 2021-06-22 RX ORDER — DIGOXIN 125 MCG
125 TABLET ORAL
COMMUNITY

## 2021-06-22 RX ORDER — SPIRONOLACTONE 25 MG/1
25 TABLET ORAL DAILY
COMMUNITY

## 2021-06-22 RX ORDER — FUROSEMIDE 20 MG/1
20 TABLET ORAL DAILY
COMMUNITY

## 2021-06-22 NOTE — PROGRESS NOTES
[facility] Cardiology    Encounter Date: 2021    Patient ID: Ryan Odonnell is a 70 y.o. male.  : 1950     PCP: Katie Reyes MD       Chief Complaint: Coronary artery disease involving native coronary artery of       PROBLEM LIST:  1. New onset atrial fibrillation with rapid ventricular response, CHADS-VASc 6  2. CAD  a. [facility] 2009: No hemodynamically significant disease. Minor luminal irregularities, 30% LAD, 10% Circ.  EF 60%  b. Echo 2017: Normal LV wall thickness.  EF 45-50%.  Mild global HK  c. MPS 10/3/2017: No anginal symptoms.  No EKG changes. Fixed apical defect. No reversible ischemia. EF 45%.  d. Echo, 2018: EF 45-50%.  e. MPS, 10/8/2018: negative for ischemia, fixed inferior defect, EF 48%.  f. Echo, 2020: EF<25%. Mild MR. Mild TR with RVSP 35-40 mmHg.   3. Angina  4. Class II obesity and adult  5. Essential hypertension  6. Mixed hyperlipidemia  7. Type 2 diabetes mellitus  8. Obstructive sleep apnea  9. Gastroesophageal reflux disease without esophagitis  10. Anxiety  11. History of CVA  a. Ischemic, left paramedian pontine lacunar stroke with right hemiparesis  12. COPD    History of Present Illness  Patient presents today for a follow-up with a history of coronary artery disease, paroxysmal atrial fibrillation, and cardiac risk factors. Since last visit, he presented to an emergency department for shortness of breath and states that he was noted to have volume overload/CHF. He was hospitalized at [facility].  Per his description it appears that he had diuresis and probably thoracentesis and was noted to have ejection fraction of 30%. His wife mentions he also had multiple emergency department visits in [place]. He attempts to use a walker but he cannot get far before having to rest. He fell and broke a few ribs 3 months ago, these have healed since then. Patient denies palpitations, dizziness, and syncope.       Allergies    Allergen Reactions   • Erythromycin    • Keflex [Cephalexin]    • Nitrates, Organic      ntg           Current Outpatient Medications:   •  ALPRAZolam (XANAX) 1 MG tablet, Take 1 mg by mouth Daily., Disp: , Rfl:   •  apixaban (ELIQUIS) 5 MG tablet tablet, Take 5 mg by mouth 2 (Two) Times a Day., Disp: , Rfl:   •  aspirin 81 MG tablet, Take 81 mg by mouth Daily., Disp: , Rfl:   •  atorvastatin (LIPITOR) 40 MG tablet, Take 40 mg by mouth Daily., Disp: , Rfl:   •  carvedilol (COREG) 12.5 MG tablet, Take 12.5 mg by mouth 2 (Two) Times a Day With Meals., Disp: , Rfl:   •  digoxin (LANOXIN) 125 MCG tablet, Take 125 mcg by mouth Daily., Disp: , Rfl:   •  escitalopram (LEXAPRO) 20 MG tablet, Take 20 mg by mouth Daily., Disp: , Rfl:   •  FENOFIBRATE PO, Take 200 mg by mouth Daily., Disp: , Rfl:   •  furosemide (LASIX) 20 MG tablet, Take 20 mg by mouth Daily., Disp: , Rfl:   •  insulin detemir (LEVEMIR) 100 UNIT/ML injection, Inject 80 Units under the skin into the appropriate area as directed Daily., Disp: , Rfl:   •  metFORMIN (GLUCOPHAGE) 500 MG tablet, Take 1,000 mg by mouth 2 (Two) Times a Day With Meals., Disp: , Rfl:   •  omeprazole (priLOSEC) 40 MG capsule, Take 40 mg by mouth Daily., Disp: , Rfl:   •  ranolazine (RANEXA) 1000 MG 12 hr tablet, Take 1 tablet by mouth 2 (Two) Times a Day. (Patient taking differently: Take 500 mg by mouth 2 (Two) Times a Day.), Disp: 60 tablet, Rfl: 6  •  sacubitril-valsartan (ENTRESTO) 24-26 MG tablet, Take 1 tablet by mouth 2 (Two) Times a Day., Disp: , Rfl:   •  SITagliptin (JANUVIA) 100 MG tablet, Take 100 mg by mouth Daily., Disp: , Rfl:   •  spironolactone (ALDACTONE) 25 MG tablet, Take 25 mg by mouth Daily., Disp: , Rfl:     The following portions of the patient's history were reviewed and updated as appropriate: allergies, current medications, past family history, past medical history, past social history, past surgical history and problem list.    ROS  Review of Systems  "  Constitution: Negative for chills, fever, fatigue, generalized weakness.   Cardiovascular: Negative for chest pain, dyspnea on exertion, leg swelling, palpitations, orthopnea, and syncope.   Respiratory: Negative for cough and wheezing. Positive for shortness of breath  HENT: Negative for ear pain, nosebleeds, and tinnitus.  Gastrointestinal: Negative for abdominal pain, constipation, diarrhea, nausea and vomiting.   Genitourinary: No urinary symptoms.  Musculoskeletal: Negative for muscle cramps.  Neurological: Negative for dizziness, headaches, loss of balance, numbness, and symptoms of stroke.  Psychiatric: Normal mental status.     All other systems reviewed and are negative.        Objective:     /62 (BP Location: Left arm, Patient Position: Sitting)   Pulse 99   Ht 167.6 cm (66\")   Wt 103 kg (227 lb)   SpO2 96%   BMI 36.64 kg/m²      Physical Exam  Constitutional: Patient appears well-developed and well-nourished.   HENT: HEENT exam unremarkable.   Neck: Neck supple. No JVD present. No carotid bruits.   Cardiovascular: Normal rate, regular rhythm and normal heart sounds. No murmur heard.   2+ symmetric pulses.   Pulmonary/Chest: Breath sounds normal. Does not exhibit tenderness.   Abdominal: Abdomen benign.   Musculoskeletal: Trace edema.   Neurological: Neurological exam unremarkable.   Vitals reviewed.    Data Review:      Lab date: 07/05/2020  · CBC: WBC 8.7, RBC 4.63, HGB 13, HCT       Procedures       Assessment:      Diagnosis Plan   1. Coronary artery disease and cardiomyopathy.  Stable without current angina and well compensated from CHF standpoint.; continue aspirin.    2. Paroxysmal atrial fibrillation (CMS/HCC)  Stable and asymptomatic; continue digoxin, carvedilol, and Eliquis   3. Essential hypertension  Well controlled; continue current medications   4. Mixed hyperlipidemia  No new data to review; continue atorvastatin and fenofibrate   5. Pulmonary edema due to congestive heart " failure/cardiomyopathy. Currently resolved after management at [facility]. Continue Entresto, Lasix and spironolactone      Plan:   Is on appropriate medical regimen for treatment of CAD, atrial fibrillation, cardiomyopathy and CHF.  He recommended continuing current medications and to closely monitor his daily weight.  In case of fluid retention or more than 3 to 5 pound weight gain or significant edema he is advised to contact us or for further recommendations.  Otherwise we will plan to see him for cardiology follow-up in 3 MO, sooner as needed.  Echocardiogram will be repeated to reassess his LV function.  Thank you for allowing us to participate in the care of your patient.     Scribed for Fish Burton MD by Sandra Bishop. 6/22/2021 10:47 EDT    I, Fish Burton MD, personally performed the services described in this documentation as scribed by the above named individual in my presence, and it is both accurate and complete.  6/28/2021  17:49 EDT        Please note that portions of this note may have been completed with a voice recognition program. Efforts were made to edit the dictations, but occasionally words are mistranscribed.